# Patient Record
Sex: MALE | Race: OTHER
[De-identification: names, ages, dates, MRNs, and addresses within clinical notes are randomized per-mention and may not be internally consistent; named-entity substitution may affect disease eponyms.]

---

## 2018-01-10 ENCOUNTER — HOSPITAL ENCOUNTER (EMERGENCY)
Dept: HOSPITAL 72 - EMR | Age: 23
Discharge: HOME | End: 2018-01-10
Payer: SELF-PAY

## 2018-01-10 VITALS — DIASTOLIC BLOOD PRESSURE: 87 MMHG | SYSTOLIC BLOOD PRESSURE: 130 MMHG

## 2018-01-10 VITALS — WEIGHT: 166 LBS | BODY MASS INDEX: 26.06 KG/M2 | HEIGHT: 67 IN

## 2018-01-10 VITALS — DIASTOLIC BLOOD PRESSURE: 78 MMHG | SYSTOLIC BLOOD PRESSURE: 121 MMHG

## 2018-01-10 DIAGNOSIS — S00.01XA: Primary | ICD-10-CM
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
## 2018-01-10 DIAGNOSIS — Z23: ICD-10-CM
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
## 2018-01-10 PROCEDURE — 90715 TDAP VACCINE 7 YRS/> IM: CPT

## 2018-01-10 PROCEDURE — 90471 IMMUNIZATION ADMIN: CPT

## 2018-01-10 PROCEDURE — 99283 EMERGENCY DEPT VISIT LOW MDM: CPT
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
